# Patient Record
Sex: MALE | Race: WHITE | NOT HISPANIC OR LATINO | URBAN - METROPOLITAN AREA
[De-identification: names, ages, dates, MRNs, and addresses within clinical notes are randomized per-mention and may not be internally consistent; named-entity substitution may affect disease eponyms.]

---

## 2023-11-09 ENCOUNTER — EMERGENCY (EMERGENCY)
Facility: HOSPITAL | Age: 28
LOS: 1 days | Discharge: HOME | End: 2023-11-09
Attending: EMERGENCY MEDICINE | Admitting: EMERGENCY MEDICINE
Payer: COMMERCIAL

## 2023-11-09 VITALS
HEART RATE: 67 BPM | HEIGHT: 72 IN | RESPIRATION RATE: 15 BRPM | WEIGHT: 169.98 LBS | OXYGEN SATURATION: 98 % | TEMPERATURE: 98 F | DIASTOLIC BLOOD PRESSURE: 79 MMHG | SYSTOLIC BLOOD PRESSURE: 120 MMHG
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
## 2023-11-09 DIAGNOSIS — S05.01XA INJURY OF CONJUNCTIVA AND CORNEAL ABRASION WITHOUT FOREIGN BODY, RIGHT EYE, INITIAL ENCOUNTER: ICD-10-CM

## 2023-11-09 DIAGNOSIS — H57.11 OCULAR PAIN, RIGHT EYE: ICD-10-CM

## 2023-11-09 PROCEDURE — 99284 EMERGENCY DEPT VISIT MOD MDM: CPT

## 2023-11-09 RX ORDER — OXYCODONE AND ACETAMINOPHEN 5; 325 MG/1; MG/1
1 TABLET ORAL
Qty: 4 | Refills: 0
Start: 2023-11-09 | End: 2023-11-09

## 2023-11-09 RX ORDER — OXYCODONE AND ACETAMINOPHEN 5; 325 MG/1; MG/1
1 TABLET ORAL ONCE
Refills: 0 | Status: DISCONTINUED | OUTPATIENT
Start: 2023-11-09 | End: 2023-11-09

## 2023-11-09 RX ORDER — MOXIFLOXACIN HCL 0.5 %
1 DROPS OPHTHALMIC (EYE)
Qty: 1 | Refills: 0
Start: 2023-11-09 | End: 2023-11-15
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
## 2023-11-09 NOTE — ED ADULT TRIAGE NOTE - CHIEF COMPLAINT QUOTE
Pt with complaint of right eye redness, swelling, pain since last night. States he has been tearing all night.

## 2023-11-09 NOTE — ED PROVIDER NOTE - NSFOLLOWUPINSTRUCTIONS_ED_ALL_ED_FT
Please follow-up with your own eye doctor within the next week.  If you do not have an eye doctor  PLEASE FOLLOW UP AS SOON AS POSSIBLE AT   [facility]  Address:  [facility] [address] [place] [address]  Phone:	[phone]    Corneal Abrasion    The cornea is the clear covering at the front and center of the eye. This very thin tissue is made up of many layers. If a scratch or injury causes the corneal epithelium to come off, it is called a corneal abrasion. Symptoms include eye pain, redness, tearing, difficulty keeping eye open, and light sensitivity. Do not drive or operate machinery if your eye is patched.  Antibiotic eye drops may be prescribed to reduce the risk of infection.  It is important to follow up with an ophthalmologist (eye doctor) to ensure proper healing.    SEEK IMMEDIATE MEDICAL CARE IF YOU HAVE ANY OF THE FOLLOWING SYMPTOMS: discharge from eyes, changes in vision, fever, or swelling.

## 2023-11-09 NOTE — ED PROVIDER NOTE - PHYSICAL EXAMINATION
VSS in NAD non toxic appearing   NCAT EOMI PERRL OP clear  Right eye with significant conjunctival injection, no discharge, no visible foreign body, positive corneal abrasion with fluorescein uptake at 6:00.  No orbital signs of swelling or cellulitis.

## 2023-11-09 NOTE — ED PROVIDER NOTE - PATIENT PORTAL LINK FT
You can access the FollowMyHealth Patient Portal offered by [facility] by registering at the following website: [email or web address]. By joining [employer]’s FollowMyHealth portal, you will also be able to view your health information using other applications (apps) compatible with our system.

## 2023-11-09 NOTE — ED PROVIDER NOTE - CLINICAL SUMMARY MEDICAL DECISION MAKING FREE TEXT BOX
Cardioversion, will prescribe pain meds and topical antibiotics, patient has an eye doctor for follow-up.
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
## 2023-12-19 ENCOUNTER — EMERGENCY (EMERGENCY)
Facility: HOSPITAL | Age: 28
LOS: 1 days | Discharge: HOME | End: 2023-12-19
Admitting: EMERGENCY MEDICINE
Payer: COMMERCIAL

## 2023-12-19 VITALS
OXYGEN SATURATION: 96 % | DIASTOLIC BLOOD PRESSURE: 74 MMHG | WEIGHT: 160.06 LBS | RESPIRATION RATE: 15 BRPM | HEIGHT: 72 IN | TEMPERATURE: 98 F | HEART RATE: 89 BPM | SYSTOLIC BLOOD PRESSURE: 116 MMHG

## 2023-12-19 PROCEDURE — 99283 EMERGENCY DEPT VISIT LOW MDM: CPT

## 2023-12-19 RX ORDER — CIPROFLOXACIN HCL 0.3 %
0.5 DROPS OPHTHALMIC (EYE)
Qty: 1 | Refills: 0
Start: 2023-12-19 | End: 2023-12-25

## 2023-12-19 RX ORDER — CIPROFLOXACIN HCL 0.3 %
2 DROPS OPHTHALMIC (EYE)
Qty: 1 | Refills: 0
Start: 2023-12-19 | End: 2023-12-25

## 2023-12-19 NOTE — ED ADULT TRIAGE NOTE - CCCP TRG CHIEF CMPLNT
Levothyroxine (Synthroid) 50 mcg + 75 mcg + Simvastatin (Zocor) 20 mg    Last Office Visit: 10/4/23  [facility] Appointments: None  Medication last refilled:     7/20/22 #90 with 3 refill(s) - Levothyroxine 50 mcg  7/20/23 #90 with 3 refill(s) - Levothyroxine 75 mcg  9/2/22 #90 with 4 refill(s) - Simvastatin    Required labs per protocol:    LAB REF RANGE 7/13/22 4/20/23   LDL < 100 mg/dL 133 High 92   TSH 0.40-4.0 mU/L 1.08 2.16     Prescription approved per [facility] Refill Protocol.    AKIN WickN, RN, CCM    
eyelid swelling

## 2023-12-19 NOTE — ED PROVIDER NOTE - CLINICAL SUMMARY MEDICAL DECISION MAKING FREE TEXT BOX
17 yo M presentse for a stye  VSS, no periorbital cellulitis, orbital cellulitis, EOMI - periorbital/ orbital cellulitis unlikely  No rash noted on face, that extends past nose - herpes zoster ophthalmicus unlikely   - abx eye drops ordered to pharmacy.  All results reviewed with pt. Pt understands and agrees with plan. Agreed to follow up with ophthalmology clinic in 2-3 days.

## 2023-12-19 NOTE — ED PROVIDER NOTE - PHYSICAL EXAMINATION
General: well developed, well nourished, no distress  Eyes: no scleral injection  Hordeolum appreciated on the left lower eyelid.  No obvious swelling appreciated.  No scleral involvement.   Neck: non-tender, full range of motion, supple.   Respiratory: unlabored breathing  Cardiovascular: no central cyanosis  Musculoskeletal: normal gait.   Extremities: normal range of motion, non-tender.  Neurologic: alert, oriented to person, oriented to place, oriented to time.    Skin: normal color.  Negative For: rash  Psychiatric: normal affect, normal insight, normal concentration

## 2023-12-19 NOTE — ED PROVIDER NOTE - NSFOLLOWUPINSTRUCTIONS_ED_ALL_ED_FT
Overview  Styes and chalazia (say "uny-KNA-ovj-uh") are both conditions that can cause swelling of the eyelid.    A stye is an infection in the root of an eyelash. The infection causes a tender red lump on the edge of the eyelid. The infection can spread until the whole eyelid becomes red and inflamed. Styes usually break open, and a tiny amount of pus drains. They usually clear up on their own in about a week, but they sometimes need treatment with antibiotics.    A chalazion is a lump or cyst in the eyelid (chalazion is singular; chalazia is plural). It is caused by swelling and inflammation of deep oil glands inside the eyelid. Chalazia are usually not infected. They can take a few months to heal.    If a chalazion becomes more swollen and painful or does not go away, you may need to have it drained by your doctor.    Follow-up care is a key part of your treatment and safety. Be sure to make and go to all appointments, and call your doctor or nurse advice line ([phone] in most provinces and [facility]) if you are having problems. It's also a good idea to know your test results and keep a list of the medicines you take.    How can you care for yourself at home?  Do not squeeze or try to open a stye or chalazion.  To help a stye or chalazion heal faster:  Put a warm, moist compress on your eye for 5 to 10 minutes, 3 to 6 times a day. Heat often brings a stye to a point where it drains on its own. Keep in mind that warm compresses will often increase swelling a little at first.  Do not use hot water or heat a wet cloth in a microwave oven. The compress may get too hot and can burn the eyelid.  If the doctor gave you antibiotic drops or ointment, use the medicine exactly as directed. Use the medicine for as long as instructed, even if your eye starts to feel better.  To put in eyedrops or ointment:  Tilt your head back, and pull your lower eyelid down with one finger.  Drop or squirt the medicine inside the lower lid.  Close your eye for 30 to 60 seconds to let the drops or ointment move around.  Do not touch the ointment or dropper tip to your eyelashes or any other surface.  Do not wear eye makeup or contact lenses until the stye or chalazion heals.  Do not share towels, pillows, or face cloths while you have a stye.  What can you do to prevent styes and chalazia?  Here are some things you can do to prevent styes and chalazia.    Don't rub your eyes. This can irritate your eyes and let in bacteria. If you need to touch your eyes, wash your hands first.  Protect your eyes from dust and air pollution when you can. For example, wear safety glasses when you do nicole chores like raking or mowing the lawn.  Remove eye makeup before you go to sleep. Replace eye makeup, especially mascara, at least every 6 months. Bacteria can grow in makeup.  If you get styes or chalazia often, wash your eyelids regularly with a little bit of baby shampoo mixed in warm water.  Treat any inflammation or infection of the eyelid promptly.  When should you call for help?  	  Call your doctor or nurse advice line now or seek immediate medical care if:    You have pain in your eye.  You have a change in vision or loss of vision.  Redness and swelling get much worse.  Watch closely for changes in your health, and be sure to contact your doctor or nurse advice line if:    Your stye does not get better in 1 week.  Your chalazion does not start to get better after several weeks. Overview  Styes and chalazia (say "uqa-BDR-foc-uh") are both conditions that can cause swelling of the eyelid.    A stye is an infection in the root of an eyelash. The infection causes a tender red lump on the edge of the eyelid. The infection can spread until the whole eyelid becomes red and inflamed. Styes usually break open, and a tiny amount of pus drains. They usually clear up on their own in about a week, but they sometimes need treatment with antibiotics.    A chalazion is a lump or cyst in the eyelid (chalazion is singular; chalazia is plural). It is caused by swelling and inflammation of deep oil glands inside the eyelid. Chalazia are usually not infected. They can take a few months to heal.    If a chalazion becomes more swollen and painful or does not go away, you may need to have it drained by your doctor.    Follow-up care is a key part of your treatment and safety. Be sure to make and go to all appointments, and call your doctor or nurse advice line ([phone] in most provinces and [facility]) if you are having problems. It's also a good idea to know your test results and keep a list of the medicines you take.    How can you care for yourself at home?  Do not squeeze or try to open a stye or chalazion.  To help a stye or chalazion heal faster:  Put a warm, moist compress on your eye for 5 to 10 minutes, 3 to 6 times a day. Heat often brings a stye to a point where it drains on its own. Keep in mind that warm compresses will often increase swelling a little at first.  Do not use hot water or heat a wet cloth in a microwave oven. The compress may get too hot and can burn the eyelid.  If the doctor gave you antibiotic drops or ointment, use the medicine exactly as directed. Use the medicine for as long as instructed, even if your eye starts to feel better.  To put in eyedrops or ointment:  Tilt your head back, and pull your lower eyelid down with one finger.  Drop or squirt the medicine inside the lower lid.  Close your eye for 30 to 60 seconds to let the drops or ointment move around.  Do not touch the ointment or dropper tip to your eyelashes or any other surface.  Do not wear eye makeup or contact lenses until the stye or chalazion heals.  Do not share towels, pillows, or face cloths while you have a stye.  What can you do to prevent styes and chalazia?  Here are some things you can do to prevent styes and chalazia.    Don't rub your eyes. This can irritate your eyes and let in bacteria. If you need to touch your eyes, wash your hands first.  Protect your eyes from dust and air pollution when you can. For example, wear safety glasses when you do nicole chores like raking or mowing the lawn.  Remove eye makeup before you go to sleep. Replace eye makeup, especially mascara, at least every 6 months. Bacteria can grow in makeup.  If you get styes or chalazia often, wash your eyelids regularly with a little bit of baby shampoo mixed in warm water.  Treat any inflammation or infection of the eyelid promptly.  When should you call for help?  	  Call your doctor or nurse advice line now or seek immediate medical care if:    You have pain in your eye.  You have a change in vision or loss of vision.  Redness and swelling get much worse.  Watch closely for changes in your health, and be sure to contact your doctor or nurse advice line if:    Your stye does not get better in 1 week.  Your chalazion does not start to get better after several weeks.

## 2023-12-19 NOTE — ED PROVIDER NOTE - OBJECTIVE STATEMENT
28-year-old male, no medical history, presents this emergency department for a hordeolum on the left lower eyelid for 2 weeks.  Patient states that his mother gave him erythromycin ointment which offered no relief of symptoms.  States that symptoms have been waxing and waning. States that he/she wakes up with eyelid stuck shut and yellow discharge in medial epicanthal fold of affected eye. Does not wear contacts/ glasses. Has not seen optometrist for this.  Has/not tried any medications for this.  Denies visual disturbance, loss of vision, blurry vision, FB sensation.

## 2023-12-19 NOTE — ED PROVIDER NOTE - PATIENT PORTAL LINK FT
You can access the FollowMyHealth Patient Portal offered by [facility] by registering at the following website: [email or web address]. By joining [employer]’s FollowMyHealth portal, you will also be able to view your health information using other applications (apps) compatible with our system. You can access the FollowMyHealth Patient Portal offered by [facility] by registering at the following website: [email or web address]. By joining [employer]’s FollowMyHealth portal, you will also be able to view your health information using other applications (apps) compatible with our system.

## 2023-12-19 NOTE — ED ADULT NURSE NOTE - NSFALLUNIVINTERV_ED_ALL_ED
Bed/Stretcher in lowest position, wheels locked, appropriate side rails in place/Call bell, personal items and telephone in reach/Instruct patient to call for assistance before getting out of bed/chair/stretcher/Non-slip footwear applied when patient is off stretcher/[employer] to call system/Physically safe environment - no spills, clutter or unnecessary equipment/Purposeful proactive rounding/Room/bathroom lighting operational, light cord in reach Bed/Stretcher in lowest position, wheels locked, appropriate side rails in place/Call bell, personal items and telephone in reach/Instruct patient to call for assistance before getting out of bed/chair/stretcher/Non-slip footwear applied when patient is off stretcher/[place] to call system/Physically safe environment - no spills, clutter or unnecessary equipment/Purposeful proactive rounding/Room/bathroom lighting operational, light cord in reach

## 2023-12-20 PROBLEM — Z78.9 OTHER SPECIFIED HEALTH STATUS: Chronic | Status: ACTIVE | Noted: 2023-11-09

## 2023-12-23 DIAGNOSIS — H57.89 OTHER SPECIFIED DISORDERS OF EYE AND ADNEXA: ICD-10-CM

## 2023-12-23 DIAGNOSIS — Z88.0 ALLERGY STATUS TO PENICILLIN: ICD-10-CM

## 2023-12-23 DIAGNOSIS — H00.015 HORDEOLUM EXTERNUM LEFT LOWER EYELID: ICD-10-CM

## 2024-03-26 NOTE — ED ADULT NURSE NOTE - NS ED NURSE RECORD ANOTHER VITAL SIGN
----- Message from Mandy Mitul sent at 3/26/2024 11:08 AM EDT -----  Subject: Message to Provider    QUESTIONS  Information for Provider? Patients daughter is calling to check on orders   requested from [facility] regarding wound care, physical   therapy and right ankle swelling per daughter facility faxed over request   for order. Please call daughter Martina ASAP.  ---------------------------------------------------------------------------  --------------  CALL BACK INFO  [phone]; OK to leave message on voicemail  ---------------------------------------------------------------------------  --------------  SCRIPT ANSWERS  Relationship to Patient? Other/Third Party  Representative Name? Martina - riccardo  Is the representative on the Communication Release of Information (NIMA)   form in Epic? Yes   Yes